# Patient Record
Sex: MALE | Race: WHITE
[De-identification: names, ages, dates, MRNs, and addresses within clinical notes are randomized per-mention and may not be internally consistent; named-entity substitution may affect disease eponyms.]

---

## 2018-09-25 ENCOUNTER — HOSPITAL ENCOUNTER (EMERGENCY)
Dept: HOSPITAL 46 - ED | Age: 58
Discharge: HOME | End: 2018-09-25
Payer: COMMERCIAL

## 2018-09-25 VITALS — BODY MASS INDEX: 31.25 KG/M2 | HEIGHT: 69 IN | WEIGHT: 211 LBS

## 2018-09-25 DIAGNOSIS — M54.5: ICD-10-CM

## 2018-09-25 DIAGNOSIS — Z79.899: ICD-10-CM

## 2018-09-25 DIAGNOSIS — R07.9: ICD-10-CM

## 2018-09-25 DIAGNOSIS — I48.91: ICD-10-CM

## 2018-09-25 DIAGNOSIS — M54.6: Primary | ICD-10-CM

## 2018-09-25 DIAGNOSIS — Z79.82: ICD-10-CM

## 2018-09-25 DIAGNOSIS — I10: ICD-10-CM

## 2018-09-25 NOTE — XMS
Clinical Summary
  Created on: 2018
 
 Jonathan Paul
 External Reference #: [id number]
 : [birth year]
 Sex: Male
 
 Demographics
 
 
+-----------------------+-----------------+
| Address               | [address]      |
|                       | [place]  [address] |
+-----------------------+-----------------+
| Preferred Language    | Unknown         |
+-----------------------+-----------------+
| Marital Status        | Single          |
+-----------------------+-----------------+
| Congregational Affiliation | Unknown         |
+-----------------------+-----------------+
| Race                  | Unknown         |
+-----------------------+-----------------+
| Ethnic Group          | Other Race      |
+-----------------------+-----------------+
 
 
 Author
 
 
+--------------+----------------------+
| Author       | [facility] |
+--------------+----------------------+
| Organization | [facility] |
+--------------+----------------------+
| Address      | Unknown              |
+--------------+----------------------+
| Phone        | Unavailable          |
+--------------+----------------------+
 
 
 
 Care Team Providers
 
 
+-----------------------+------+-------------+
| Care Team Member Name | Role | Phone       |
+-----------------------+------+-------------+
 PP   | Unavailable |
+-----------------------+------+-------------+
 
 
 
 Source Comments
 PRINCESS is fully live on both [facility] and [facility] InPatient.[facility]
 
 Allergies
 
 Not on File
 
 Current Medications
 Not on file
 
 Active Problems
 Not on file
 
 Social History
 
 
+----------------+-------+-----------+--------+------+
| Tobacco Use    | Types | Packs/Day | Years  | Date |
|                |       |           | Used   |      |
+----------------+-------+-----------+--------+------+
| Never Assessed |       |           |        |      |
+----------------+-------+-----------+--------+------+
 
 
 
+------------------+---------------+
| Sex Assigned at  | Date Recorded |
| Birth            |               |
+------------------+---------------+
| Not on file      |               |
+------------------+---------------+
 
 
 
 Plan of Treatment
 
 
+--------------------+-----------+-----------+----------+
| Health Maintenance | Due Date  | Last Done | Comments |
+--------------------+-----------+-----------+----------+
| INFLUENZA VACCINE  |  |           |          |
| (FLU SHOT)         | 8         |           |          |
+--------------------+-----------+-----------+----------+
 
 
 
 Results
 Not on filefrom Last 3 Months
 
 Insurance
 
 
+------------------+--------+-------------+------+-------------+----------------------+
| Payer            | Benefi | Subscriber  | Type | Phone       | Address              |
|                  | t Plan | ID          |      |             |                      |
|                  |  /     |             |      |             |                      |
|                  | Group  |             |      |             |                      |
+------------------+--------+-------------+------+-------------+----------------------+
| [employer]UE  | [employer] | xxxxxxxxxxx | PPO  | [phone] |   [address]  [place] |
| SHIELD           | [address] [employer] | xxx         |      | [address]        |  [place], [place]       |
|                  |        |             |      |             | [address]           |
+------------------+--------+-------------+------+-------------+----------------------+
 
 
 
 
+----------------+--------+-------------+--------+-------+----------------------+
| Guarantor Name | Accoun | Relation to | Date   | Phone | Billing Address      |
|                | t Type |  Patient    | of     |       |                      |
|                |        |             | Birth  |       |                      |
+----------------+--------+-------------+--------+-------+----------------------+
| JONATHAN PAUL | Person | Self        | [birth year] |       |   [address],  |
|                | al/Johan |             | 1960   |       | [place] [phone]             |
|                | pato    |             |        |       |                      |
+----------------+--------+-------------+--------+-------+----------------------+

## 2018-09-25 NOTE — EKG
[facility]
                                    [address]
                                  [place]  [address]
_________________________________________________________________________________________
                                                                 Signed   
 
 
Atrial fibrillation with rapid ventricular response
Abnormal ECG
No previous ECGs available
Confirmed by JANELL PEARSON MD ([id number]) on 9/25/2018 4:49:53 PM
 
 
 
 
 
 
 
 
 
 
 
 
 
 
 
 
 
 
 
 
 
 
 
 
 
 
 
 
 
 
 
 
 
 
 
 
 
 
 
 
 
    Electronically Signed By: JANELL PEARSON MD  09/25/18 1650
_________________________________________________________________________________________
PATIENT NAME:     DOUGIEJONATHAN MIKI                  
MEDICAL RECORD #: [id number]                     Electrocardiogram             
          ACCT #: [id number]  
DATE OF BIRTH:   [birth year]                                       
PHYSICIAN:   JANELL PEARSON MD           REPORT #: [id number]
REPORT IS CONFIDENTIAL AND NOT TO BE RELEASED WITHOUT AUTHORIZATION

## 2018-09-25 NOTE — XMS
Clinical Summary
  Created on: 2018
 
 Jonathan Paul
 External Reference #: [id number]
 : [birth year]
 Sex: Male
 
 Demographics
 
 
+-----------------------+----------------------+
| Address               | [address]           |
|                       | [place]  [address] |
+-----------------------+----------------------+
| Home Phone            | [phone]      |
+-----------------------+----------------------+
| Preferred Language    | Unknown              |
+-----------------------+----------------------+
| Marital Status        |               |
+-----------------------+----------------------+
| Sikh Affiliation | Unknown              |
+-----------------------+----------------------+
| Race                  | Unknown              |
+-----------------------+----------------------+
| Ethnic Group          | Unknown              |
+-----------------------+----------------------+
 
 
 Author
 
 
+--------------+-----------------------+
| Author       | [facility] |
+--------------+-----------------------+
| Organization | [facility] |
+--------------+-----------------------+
| Address      | Unknown               |
+--------------+-----------------------+
| Phone        | Unavailable           |
+--------------+-----------------------+
 
 
 
 Support
 
 
+--------------+--------------+---------+-----------------+
| Name         | Relationship | Address | Phone           |
+--------------+--------------+---------+-----------------+
| Heather Paul | ECON         | Unknown | [phone] |
+--------------+--------------+---------+-----------------+
 
 
 
 Care Team Providers
 
 
 
+-----------------------+------+-----------------+
| Care Team Member Name | Role | Phone           |
+-----------------------+------+-----------------+
| Braden Andrews MD  | PP   | [phone] |
+-----------------------+------+-----------------+
 
 
 
 Allergies
 Not on File
 
 Current Medications
 Not on file
 
 Active Problems
 Not on file
 
 Social History
 
 
+----------------+-------+-----------+--------+------+
| Tobacco Use    | Types | Packs/Day | Years  | Date |
|                |       |           | Used   |      |
+----------------+-------+-----------+--------+------+
| Never Assessed |       |           |        |      |
+----------------+-------+-----------+--------+------+
 
 
 
+------------------+---------------+
| Sex Assigned at  | Date Recorded |
| Birth            |               |
+------------------+---------------+
| Not on file      |               |
+------------------+---------------+
 
 
 
 Plan of Treatment
 
 
+--------+----------+-----------+----------------------+-------------+
| Date   | Type     | Specialty | Care Team            | Description |
+--------+----------+-----------+----------------------+-------------+
| / | Initial  |           |   Jenny Faulkner, |             |
|    | consult  |           |  MD [address]   |             |
|        |          |           | [address],  |             |
|        |          |           | [place] [address]             |             |
|        |          |           | [phone]         |             |
|        |          |           | [phone] (Fax)   |             |
+--------+----------+-----------+----------------------+-------------+
 
 
 
+---------------------+-----------+-----------+----------+
| Health Maintenance  | Due Date  | Last Done | Comments |
+---------------------+-----------+-----------+----------+
| Vaccine:            | 09/15/197 |           |          |
| Dtap/Tdap/Td (1 -   | 9         |           |          |
| Tdap)               |           |           |          |
 
+---------------------+-----------+-----------+----------+
| Colon Cancer        | 09/15/201 |           |          |
| Screening           | 0         |           |          |
| (Colonoscopy)       |           |           |          |
+---------------------+-----------+-----------+----------+
| Vaccine: Influenza  |  |           |          |
| (#1)                | 8         |           |          |
+---------------------+-----------+-----------+----------+
 
 
 
 Results
 Not on filefrom Last 3 Months
 
 Insurance
 
 
+-----------------+--------+-------------+------+-------+---------+
| Payer           | Benefi | Subscriber  | Type | Phone | Address |
|                 | t Plan | ID          |      |       |         |
|                 |  /     |             |      |       |         |
|                 | Group  |             |      |       |         |
+-----------------+--------+-------------+------+-------+---------+
| [employer] [facility] -  | FIRST  | [id number]   |      |       |         |
| [place]        | HEALTH | 01          |      |       |         |
|                 |  -     |             |      |       |         |
|                 | PACIFI |             |      |       |         |
|                 | CSOURC |             |      |       |         |
|                 | E      |             |      |       |         |
+-----------------+--------+-------------+------+-------+---------+
 
 
 
+------------------+--------+-------------+--------+-------------+----------------------+
| Guarantor Name   | Accoun | Relation to | Date   | Phone       | Billing Address      |
|                  | t Type |  Patient    | of     |             |                      |
|                  |        |             | Birth  |             |                      |
+------------------+--------+-------------+--------+-------------+----------------------+
| JONATHAN PAUL | Person | Self        | [birth year] |   Home:     |   [address],  |
|                  | al/Fam |             | 1960   | [phone] | [place] [phone]        |
|                  | pato    |             |        | [id number]        |                      |
+------------------+--------+-------------+--------+-------------+----------------------+

## 2018-09-25 NOTE — XMS
Clinical Summary
  Created on: 2018
 
 Jonathan Paul
 External Reference #: [id number]
 : [birth year]
 Sex: Male
 
 Demographics
 
 
+-----------------------+----------------------+
| Address               | [address]           |
|                       | [place]  [address] |
+-----------------------+----------------------+
| Home Phone            | [phone]      |
+-----------------------+----------------------+
| Preferred Language    | Unknown              |
+-----------------------+----------------------+
| Marital Status        |               |
+-----------------------+----------------------+
| Evangelical Affiliation | Unknown              |
+-----------------------+----------------------+
| Race                  | Unknown              |
+-----------------------+----------------------+
| Ethnic Group          | Unknown              |
+-----------------------+----------------------+
 
 
 Author
 
 
+--------------+-----------------------+
| Author       | [facility] |
+--------------+-----------------------+
| Organization | [facility] |
+--------------+-----------------------+
| Address      | Unknown               |
+--------------+-----------------------+
| Phone        | Unavailable           |
+--------------+-----------------------+
 
 
 
 Support
 
 
+--------------+--------------+---------+-----------------+
| Name         | Relationship | Address | Phone           |
+--------------+--------------+---------+-----------------+
| Heather Paul | ECON         | Unknown | [phone] |
+--------------+--------------+---------+-----------------+
 
 
 
 Care Team Providers
 
 
 
+-----------------------+------+-----------------+
| Care Team Member Name | Role | Phone           |
+-----------------------+------+-----------------+
| Braden Andrews MD  | PP   | [phone] |
+-----------------------+------+-----------------+
 
 
 
 Allergies
 Not on File
 
 Current Medications
 Not on file
 
 Active Problems
 Not on file
 
 Social History
 
 
+----------------+-------+-----------+--------+------+
| Tobacco Use    | Types | Packs/Day | Years  | Date |
|                |       |           | Used   |      |
+----------------+-------+-----------+--------+------+
| Never Assessed |       |           |        |      |
+----------------+-------+-----------+--------+------+
 
 
 
+------------------+---------------+
| Sex Assigned at  | Date Recorded |
| Birth            |               |
+------------------+---------------+
| Not on file      |               |
+------------------+---------------+
 
 
 
 Plan of Treatment
 
 
+--------+----------+-----------+----------------------+-------------+
| Date   | Type     | Specialty | Care Team            | Description |
+--------+----------+-----------+----------------------+-------------+
| / | Initial  |           |   Jenny Faulkner, |             |
|    | consult  |           |  MD [address]   |             |
|        |          |           | [address],  |             |
|        |          |           | [place] [address]             |             |
|        |          |           | [phone]         |             |
|        |          |           | [phone] (Fax)   |             |
+--------+----------+-----------+----------------------+-------------+
 
 
 
+---------------------+-----------+-----------+----------+
| Health Maintenance  | Due Date  | Last Done | Comments |
+---------------------+-----------+-----------+----------+
| Vaccine:            | 09/15/197 |           |          |
| Dtap/Tdap/Td (1 -   | 9         |           |          |
| Tdap)               |           |           |          |
 
+---------------------+-----------+-----------+----------+
| Colon Cancer        | 09/15/201 |           |          |
| Screening           | 0         |           |          |
| (Colonoscopy)       |           |           |          |
+---------------------+-----------+-----------+----------+
| Vaccine: Influenza  |  |           |          |
| (#1)                | 8         |           |          |
+---------------------+-----------+-----------+----------+
 
 
 
 Results
 Not on filefrom Last 3 Months
 
 Insurance
 
 
+-----------------+--------+-------------+------+-------+---------+
| Payer           | Benefi | Subscriber  | Type | Phone | Address |
|                 | t Plan | ID          |      |       |         |
|                 |  /     |             |      |       |         |
|                 | Group  |             |      |       |         |
+-----------------+--------+-------------+------+-------+---------+
| [facility] -  | FIRST  | [id number]   |      |       |         |
| [place]        | HEALTH | 01          |      |       |         |
|                 |  -     |             |      |       |         |
|                 | PACIFI |             |      |       |         |
|                 | CSOURC |             |      |       |         |
|                 | E      |             |      |       |         |
+-----------------+--------+-------------+------+-------+---------+
 
 
 
+------------------+--------+-------------+--------+-------------+----------------------+
| Guarantor Name   | Accoun | Relation to | Date   | Phone       | Billing Address      |
|                  | t Type |  Patient    | of     |             |                      |
|                  |        |             | Birth  |             |                      |
+------------------+--------+-------------+--------+-------------+----------------------+
| JONATHAN PAUL | Person | Self        | [birth year] |   Home:     |   [address],  |
|                  | al/Fam |             | 1960   | [phone] | [place] [phone]        |
|                  | pato    |             |        | [address]        |                      |
+------------------+--------+-------------+--------+-------------+----------------------+

## 2018-09-25 NOTE — XMS
Clinical Summary
  Created on: 2018
 
 Jonathan Paul
 External Reference #: [id number]
 : [birth year]
 Sex: Male
 
 Demographics
 
 
+-----------------------+-----------------+
| Address               | [address]      |
|                       | [place]  [address] |
+-----------------------+-----------------+
| Preferred Language    | Unknown         |
+-----------------------+-----------------+
| Marital Status        | Single          |
+-----------------------+-----------------+
| Gnosticist Affiliation | Unknown         |
+-----------------------+-----------------+
| Race                  | Unknown         |
+-----------------------+-----------------+
| Ethnic Group          | Other Race      |
+-----------------------+-----------------+
 
 
 Author
 
 
+--------------+----------------------+
| Author       | [facility] |
+--------------+----------------------+
| Organization | [facility] |
+--------------+----------------------+
| Address      | Unknown              |
+--------------+----------------------+
| Phone        | Unavailable          |
+--------------+----------------------+
 
 
 
 Care Team Providers
 
 
+-----------------------+------+-------------+
| Care Team Member Name | Role | Phone       |
+-----------------------+------+-------------+
 PP   | Unavailable |
+-----------------------+------+-------------+
 
 
 
 Source Comments
 PRINCESS is fully live on both [facility] Ambulatory and [facility] InPatient.[facility]
 
 Allergies
 
 Not on File
 
 Current Medications
 Not on file
 
 Active Problems
 Not on file
 
 Social History
 
 
+----------------+-------+-----------+--------+------+
| Tobacco Use    | Types | Packs/Day | Years  | Date |
|                |       |           | Used   |      |
+----------------+-------+-----------+--------+------+
| Never Assessed |       |           |        |      |
+----------------+-------+-----------+--------+------+
 
 
 
+------------------+---------------+
| Sex Assigned at  | Date Recorded |
| Birth            |               |
+------------------+---------------+
| Not on file      |               |
+------------------+---------------+
 
 
 
 Plan of Treatment
 
 
+--------------------+-----------+-----------+----------+
| Health Maintenance | Due Date  | Last Done | Comments |
+--------------------+-----------+-----------+----------+
| INFLUENZA VACCINE  |  |           |          |
| (FLU SHOT)         | 8         |           |          |
+--------------------+-----------+-----------+----------+
 
 
 
 Results
 Not on filefrom Last 3 Months
 
 Insurance
 
 
+------------------+--------+-------------+------+-------------+----------------------+
| Payer            | Benefi | Subscriber  | Type | Phone       | Address              |
|                  | t Plan | ID          |      |             |                      |
|                  |  /     |             |      |             |                      |
|                  | Group  |             |      |             |                      |
+------------------+--------+-------------+------+-------------+----------------------+
| [employer]UE [employer]OSS BLUE  | [employer] | xxxxxxxxxxx | PPO  | [phone] |   [address]  [place] |
| SHIELD           | E [employer] | xxx         |      | [address]        |  [place], [place]       |
|                  |        |             |      |             | [address]           |
+------------------+--------+-------------+------+-------------+----------------------+
 
 
 
 
+----------------+--------+-------------+--------+-------+----------------------+
| Guarantor Name | Accoun | Relation to | Date   | Phone | Billing Address      |
|                | t Type |  Patient    | of     |       |                      |
|                |        |             | Birth  |       |                      |
+----------------+--------+-------------+--------+-------+----------------------+
| JONATHAN PAUL | Person | Self        | [birth year] |       |   [address],  |
|                | al/Johan |             | 1960   |       | [place] [address]             |
|                | pato    |             |        |       |                      |
+----------------+--------+-------------+--------+-------+----------------------+